# Patient Record
Sex: MALE | Race: WHITE | Employment: OTHER | URBAN - NONMETROPOLITAN AREA
[De-identification: names, ages, dates, MRNs, and addresses within clinical notes are randomized per-mention and may not be internally consistent; named-entity substitution may affect disease eponyms.]

---

## 2021-09-25 ENCOUNTER — APPOINTMENT (OUTPATIENT)
Dept: GENERAL RADIOLOGY | Age: 76
End: 2021-09-25
Payer: OTHER GOVERNMENT

## 2021-09-25 ENCOUNTER — HOSPITAL ENCOUNTER (EMERGENCY)
Age: 76
Discharge: HOME | End: 2021-09-25
Attending: EMERGENCY MEDICINE
Payer: OTHER GOVERNMENT

## 2021-09-25 VITALS
WEIGHT: 228 LBS | BODY MASS INDEX: 30.88 KG/M2 | SYSTOLIC BLOOD PRESSURE: 178 MMHG | TEMPERATURE: 98.1 F | DIASTOLIC BLOOD PRESSURE: 76 MMHG | HEIGHT: 72 IN | OXYGEN SATURATION: 95 % | HEART RATE: 76 BPM | RESPIRATION RATE: 18 BRPM

## 2021-09-25 DIAGNOSIS — J06.9 VIRAL URI WITH COUGH: ICD-10-CM

## 2021-09-25 DIAGNOSIS — Z20.822 SUSPECTED COVID-19 VIRUS INFECTION: Primary | ICD-10-CM

## 2021-09-25 DIAGNOSIS — J40 BRONCHITIS: ICD-10-CM

## 2021-09-25 LAB
CHP ED QC CHECK: YES
GLUCOSE BLD-MCNC: 152 MG/DL
GLUCOSE BLD-MCNC: 152 MG/DL (ref 70–99)

## 2021-09-25 PROCEDURE — 94640 AIRWAY INHALATION TREATMENT: CPT

## 2021-09-25 PROCEDURE — 99283 EMERGENCY DEPT VISIT LOW MDM: CPT

## 2021-09-25 PROCEDURE — 93005 ELECTROCARDIOGRAM TRACING: CPT | Performed by: EMERGENCY MEDICINE

## 2021-09-25 PROCEDURE — U0005 INFEC AGEN DETEC AMPLI PROBE: HCPCS

## 2021-09-25 PROCEDURE — 71045 X-RAY EXAM CHEST 1 VIEW: CPT

## 2021-09-25 PROCEDURE — 6370000000 HC RX 637 (ALT 250 FOR IP): Performed by: EMERGENCY MEDICINE

## 2021-09-25 PROCEDURE — 82962 GLUCOSE BLOOD TEST: CPT

## 2021-09-25 PROCEDURE — U0003 INFECTIOUS AGENT DETECTION BY NUCLEIC ACID (DNA OR RNA); SEVERE ACUTE RESPIRATORY SYNDROME CORONAVIRUS 2 (SARS-COV-2) (CORONAVIRUS DISEASE [COVID-19]), AMPLIFIED PROBE TECHNIQUE, MAKING USE OF HIGH THROUGHPUT TECHNOLOGIES AS DESCRIBED BY CMS-2020-01-R: HCPCS

## 2021-09-25 RX ORDER — ALBUTEROL SULFATE 90 UG/1
2 AEROSOL, METERED RESPIRATORY (INHALATION) 4 TIMES DAILY PRN
Qty: 18 G | Refills: 0 | Status: SHIPPED | OUTPATIENT
Start: 2021-09-25

## 2021-09-25 RX ORDER — GUAIFENESIN 600 MG/1
1200 TABLET, EXTENDED RELEASE ORAL 2 TIMES DAILY PRN
Qty: 40 TABLET | Refills: 0 | Status: SHIPPED | OUTPATIENT
Start: 2021-09-25 | End: 2021-10-05

## 2021-09-25 RX ORDER — ALBUTEROL SULFATE 90 UG/1
2 AEROSOL, METERED RESPIRATORY (INHALATION) ONCE
Status: COMPLETED | OUTPATIENT
Start: 2021-09-25 | End: 2021-09-25

## 2021-09-25 RX ORDER — ALBUTEROL SULFATE 90 UG/1
2 AEROSOL, METERED RESPIRATORY (INHALATION) 4 TIMES DAILY PRN
Qty: 18 G | Refills: 0 | Status: SHIPPED | OUTPATIENT
Start: 2021-09-25 | End: 2021-09-25 | Stop reason: SDUPTHER

## 2021-09-25 RX ADMIN — ALBUTEROL SULFATE 2 PUFF: 108 INHALANT RESPIRATORY (INHALATION) at 11:44

## 2021-09-25 NOTE — ED PROVIDER NOTES
CHIEF COMPLAINT  Chief Complaint   Patient presents with    Cough     x 1 month    Chest Congestion       HPI  Elena Bermudez is a 70 y.o. male with history of cancer, hypertension, diabetes on Metformin with insulin usage who presents cough over the past month with shortness of breath and chest congestion that worsened since last night. Last Monday he was tested for Covid and was negative on that date. His wife started to become ill last Wednesday and then she tested positive for Covid. Starting last night he has had chest congestion, cough, decreased appetite and fatigue. He is uncertain if he should take his insulin as he is not eaten anything yet today. He denies any leg swelling or history of DVT or PE. He states he like to be tested for Covid to know whether or not he needs to quarantine. Signs and symptoms are currently mild to moderate, persistent. He denies any history of underlying lung condition. REVIEW OF SYSTEMS  Review of Systems   History obtained from chart review and the patient  General ROS: positive for  - fatigue  Ophthalmic ROS: negative for - decreased vision or double vision  ENT ROS: negative for - headaches  Hematological and Lymphatic ROS: negative for - bleeding problems or bruising  Endocrine ROS: negative for - unexpected weight changes  Respiratory ROS: positive for - cough and shortness of breath  Cardiovascular ROS: no chest pain or dyspnea on exertion  Gastrointestinal ROS: no abdominal pain, change in bowel habits, or black or bloody stools  Genito-Urinary ROS: no dysuria, trouble voiding, or hematuria  Musculoskeletal ROS: negative for - joint stiffness or joint swelling  Neurological ROS: no TIA or stroke symptoms      PAST MEDICAL HISTORY  Past Medical History:   Diagnosis Date    Cancer ([facility] [address]     Hypertension     Osteoarthritis        FAMILY HISTORY  History reviewed. No pertinent family history.     SOCIAL HISTORY  Social History     Socioeconomic History    Marital status:      Spouse name: None    Number of children: None    Years of education: None    Highest education level: None   Occupational History    None   Tobacco Use    Smoking status: Never Smoker    Smokeless tobacco: Never Used   Substance and Sexual Activity    Alcohol use: Not Currently    Drug use: Not Currently    Sexual activity: None   Other Topics Concern    None   Social History Narrative    None     Social Determinants of Health     Financial Resource Strain:     Difficulty of Paying Living Expenses:    Food Insecurity:     Worried About Running Out of Food in the Last Year:     Ran Out of Food in the Last Year:    Transportation Needs:     Lack of Transportation (Medical):  Lack of Transportation (Non-Medical):    Physical Activity:     Days of Exercise per Week:     Minutes of Exercise per Session:    Stress:     Feeling of Stress :    Social Connections:     Frequency of Communication with Friends and Family:     Frequency of Social Gatherings with Friends and Family:     Attends Christianity Services:     Active Member of Clubs or Organizations:     Attends Club or Organization Meetings:     Marital Status:    Intimate Partner Violence:     Fear of Current or Ex-Partner:     Emotionally Abused:     Physically Abused:     Sexually Abused:        SURGICAL HISTORY  History reviewed. No pertinent surgical history. CURRENT MEDICATIONS  No current facility-administered medications on file prior to encounter.      Current Outpatient Medications on File Prior to Encounter   Medication Sig Dispense Refill    aspirin 81 MG tablet Take 81 mg by mouth daily      vitamin B-12 (CYANOCOBALAMIN) 500 MCG tablet Take 500 mcg by mouth daily      insulin glargine (LANTUS) 100 UNIT/ML injection vial Inject into the skin nightly      insulin lispro (HUMALOG) 100 UNIT/ML injection vial Inject into the skin 3 times daily (before meals)      losartan (COZAAR) 25 MG tablet Take 25 mg by mouth daily      metFORMIN (GLUCOPHAGE) 850 MG tablet Take 850 mg by mouth 2 times daily (with meals)      Multiple Vitamins-Minerals (THERAPEUTIC MULTIVITAMIN-MINERALS) tablet Take 1 tablet by mouth daily      simvastatin (ZOCOR) 40 MG tablet Take 40 mg by mouth nightly      tadalafil (CIALIS) 20 MG tablet Take 20 mg by mouth as needed for Erectile Dysfunction      terbinafine (LAMISIL) 1 % cream Apply topically 2 times daily Apply topically 2 times daily. ALLERGIES  Allergies   Allergen Reactions    Lisinopril     Toradol [Ketorolac Tromethamine]        PHYSICAL EXAM  VITAL SIGNS: BP (!) 178/76   Pulse 76   Temp 98.1 °F (36.7 °C) (Oral)   Resp 18   Ht 6' (1.829 m)   Wt 228 lb (103.4 kg)   SpO2 95%   BMI 30.92 kg/m²   Constitutional: Well developed, Well nourished, resting in bed, active, pleasant  HENT: Normocephalic, Atraumatic, Bilateral external ears normal, mask in place per recommendations  Eyes: PERRL, EOMI, Conjunctiva normal, No discharge. Neck: Normal range of motion, Supple, No stridor. Cardiovascular: Normal heart rate, Normal rhythm, No murmurs, No rubs, No gallops. Thorax & Lungs: Diminished bibasilar breath sounds, No respiratory distress, mild expiratory wheezing, No chest tenderness. Abdomen: Bowel sounds normal, Soft, No tenderness, no guarding, no rebound, No masses, No pulsatile masses. Skin: Warm, Dry, No erythema, No rash. Extremities: Intact distal pulses, No edema, No tenderness, No cyanosis, No clubbing. Musculoskeletal: Good gross range of motion in all major joints. No major deformities noted. Neurologic: Alert & oriented x 3, Normal gross motor function, Normal gross sensory function, No focal deficits noted.    Psychiatric: Affect normal    EKG  EKG Interpretation    Interpreted by emergency department physician from September 25 at 1154    Rhythm: normal sinus   Rate: normal  Axis: normal  Ectopy: none  Conduction: normal  ST Segments: nonspecific changes  T Waves: non specific changes  Q Waves: none    Clinical Impression: Normal sinus rhythm with a rate of 75 and nonspecific ST abnormalities, greatest in the anterior leads, no STEMI or ectopy    Arcadio Torrez MD      RADIOLOGY/PROCEDURES/LABS  Last Imaging results   XR CHEST PORTABLE   Preliminary Result   1. Mild cardiomegaly. 2. Asymmetric elevation of the right diaphragm. 3. No acute focal process identified. Imaging reviewed by myself    Labs Reviewed   POCT GLUCOSE - Abnormal; Notable for the following components:       Result Value    POC Glucose 152 (*)     All other components within normal limits   POCT GLUCOSE - Normal   COVID-19         Medications   albuterol sulfate  (90 Base) MCG/ACT inhaler 2 puff (2 puffs Inhalation Given 9/25/21 1144)       COURSE & MEDICAL DECISION MAKING  Pertinent Labs & Imaging studies reviewed. (See chart for details)    79-year-old male presents with viral URI type signs and symptoms with suggestion of bronchitis. He does have some diminished aeration, harsh coughing, rhonchi and some mild wheezes. He was started on albuterol and had improvement and started having some mild productive nature to his cough as the bronchospasm improved. He also was concerned regarding his insulin as he has decreased appetite. As he is having decreased p.o. intake, his insulin will be down adjusted for the next several days until his appetite returns, halving his insulin with meals and keeping a close eye on his blood sugar. His chest x-ray does not demonstrate focal pneumonia, he is saturating well on room air, is otherwise active without increased work of breathing. He will be discharged with directed to quarantine, Covid testing pending based on his significant exposure and signs and symptoms.         FINAL IMPRESSION  Problem List Items Addressed This Visit     None      Visit Diagnoses     Suspected COVID-19 virus infection    -  Primary Viral URI with cough        Bronchitis          1.    2.   3.    Patient gave me permission to discuss medical history, care, and plan with those present in the room.   Electronically signed by: Darci Marquez MD, 9/25/2021  MD Darci Montalvo MD  09/25/21 1626

## 2021-09-25 NOTE — ED NOTES
Discharge instructions reviewed; patient verbalized understanding; patient transferred from ED via private vehicle by family.       Jennifer Oconnell RN  09/25/21 0710

## 2021-09-26 LAB
EKG ATRIAL RATE: 75 BPM
EKG DIAGNOSIS: NORMAL
EKG P AXIS: 24 DEGREES
EKG P-R INTERVAL: 178 MS
EKG Q-T INTERVAL: 362 MS
EKG QRS DURATION: 94 MS
EKG QTC CALCULATION (BAZETT): 404 MS
EKG R AXIS: 29 DEGREES
EKG T AXIS: -15 DEGREES
EKG VENTRICULAR RATE: 75 BPM

## 2021-09-26 PROCEDURE — 93010 ELECTROCARDIOGRAM REPORT: CPT | Performed by: INTERNAL MEDICINE

## 2021-09-27 ENCOUNTER — CARE COORDINATION (OUTPATIENT)
Dept: CARE COORDINATION | Age: 76
End: 2021-09-27

## 2021-09-27 LAB
SARS-COV-2: NOT DETECTED
SOURCE: NORMAL

## 2021-09-28 ENCOUNTER — CARE COORDINATION (OUTPATIENT)
Dept: CARE COORDINATION | Age: 76
End: 2021-09-28

## 2021-09-28 NOTE — CARE COORDINATION
Patient contacted regarding COVID-19 risk. Discussed COVID-19 related testing which was available at this time. Test results were negative. Patient informed of results, if available? Yes. Ambulatory Care Manager contacted the patient by telephone to perform post discharge assessment. Call within 2 business days of discharge: Yes. Verified name and  with patient as identifiers. Provided introduction to self, and explanation of the CTN/ACM role, and reason for call due to risk factors for infection and/or exposure to COVID-19. Symptoms reviewed with patient who verbalized the following symptoms: no new symptoms and no worsening symptoms. Due to no new or worsening symptoms encounter was not routed to provider for escalation. Discussed follow-up appointments. If no appointment was previously scheduled, appointment scheduling offered: No.  [facility] follow up appointment(s): No future appointments. Non-[facility] follow up appointment(s):     Non-face-to-face services provided:  Obtained and reviewed discharge summary and/or continuity of care documents     Advance Care Planning:   Does patient have an Advance Directive:  decision maker updated. Educated patient about risk for severe COVID-19 due to risk factors according to CDC guidelines. ACM reviewed discharge instructions, medical action plan and red flag symptoms with the patient who verbalized understanding. Discussed COVID vaccination status: Yes. Education provided on COVID-19 vaccination as appropriate. Discussed exposure protocols and quarantine with CDC Guidelines. Patient was given an opportunity to verbalize any questions and concerns and agrees to contact ACM or health care provider for questions related to their healthcare. Reviewed and educated patient on any new and changed medications related to discharge diagnosis     Was patient discharged with a pulse oximeter?  No Discussed and confirmed pulse oximeter discharge instructions and when

## 2022-01-05 ENCOUNTER — HOSPITAL ENCOUNTER (EMERGENCY)
Age: 77
Discharge: HOME | End: 2022-01-05
Attending: EMERGENCY MEDICINE
Payer: OTHER GOVERNMENT

## 2022-01-05 ENCOUNTER — APPOINTMENT (OUTPATIENT)
Dept: GENERAL RADIOLOGY | Age: 77
End: 2022-01-05
Payer: OTHER GOVERNMENT

## 2022-01-05 VITALS
WEIGHT: 230 LBS | DIASTOLIC BLOOD PRESSURE: 76 MMHG | SYSTOLIC BLOOD PRESSURE: 163 MMHG | TEMPERATURE: 97.6 F | OXYGEN SATURATION: 96 % | HEIGHT: 72 IN | HEART RATE: 70 BPM | RESPIRATION RATE: 18 BRPM | BODY MASS INDEX: 31.15 KG/M2

## 2022-01-05 DIAGNOSIS — R11.2 NAUSEA AND VOMITING, INTRACTABILITY OF VOMITING NOT SPECIFIED, UNSPECIFIED VOMITING TYPE: ICD-10-CM

## 2022-01-05 DIAGNOSIS — J06.9 UPPER RESPIRATORY TRACT INFECTION, UNSPECIFIED TYPE: Primary | ICD-10-CM

## 2022-01-05 PROCEDURE — U0003 INFECTIOUS AGENT DETECTION BY NUCLEIC ACID (DNA OR RNA); SEVERE ACUTE RESPIRATORY SYNDROME CORONAVIRUS 2 (SARS-COV-2) (CORONAVIRUS DISEASE [COVID-19]), AMPLIFIED PROBE TECHNIQUE, MAKING USE OF HIGH THROUGHPUT TECHNOLOGIES AS DESCRIBED BY CMS-2020-01-R: HCPCS

## 2022-01-05 PROCEDURE — U0005 INFEC AGEN DETEC AMPLI PROBE: HCPCS

## 2022-01-05 PROCEDURE — 99283 EMERGENCY DEPT VISIT LOW MDM: CPT

## 2022-01-05 PROCEDURE — 71046 X-RAY EXAM CHEST 2 VIEWS: CPT

## 2022-01-05 RX ORDER — ONDANSETRON 4 MG/1
4 TABLET, ORALLY DISINTEGRATING ORAL EVERY 6 HOURS PRN
Qty: 10 TABLET | Refills: 0 | Status: SHIPPED | OUTPATIENT
Start: 2022-01-05

## 2022-01-05 RX ORDER — ALBUTEROL SULFATE 90 UG/1
2 AEROSOL, METERED RESPIRATORY (INHALATION) EVERY 4 HOURS PRN
Qty: 18 G | Refills: 0 | Status: SHIPPED | OUTPATIENT
Start: 2022-01-05

## 2022-01-05 RX ORDER — DEXTROMETHORPHAN HYDROBROMIDE AND PROMETHAZINE HYDROCHLORIDE 15; 6.25 MG/5ML; MG/5ML
5 SYRUP ORAL 4 TIMES DAILY PRN
Qty: 120 ML | Refills: 0 | Status: SHIPPED | OUTPATIENT
Start: 2022-01-05 | End: 2022-01-12

## 2022-01-05 RX ORDER — BENZONATATE 100 MG/1
200 CAPSULE ORAL 3 TIMES DAILY PRN
Qty: 21 CAPSULE | Refills: 0 | Status: SHIPPED | OUTPATIENT
Start: 2022-01-05 | End: 2022-01-12

## 2022-01-05 RX ORDER — LOPERAMIDE HYDROCHLORIDE 2 MG/1
2 CAPSULE ORAL 4 TIMES DAILY PRN
Qty: 30 CAPSULE | Refills: 0 | Status: SHIPPED | OUTPATIENT
Start: 2022-01-05 | End: 2022-01-12

## 2022-01-05 NOTE — ED PROVIDER NOTES
Emergency Department Encounter  Location: [facility] [address] [place] [address]    Patient: Sergio Walsh  MRN: [id number]  : 1945  Date of evaluation: 2022  ED Provider: Cody Evans DO, FACEP    Chief Complaint:    Concern For COVID-19    [place]:  Sergio Walsh is a 78 y.o. male that presents to the emergency department with complaints of a cough and one episode of vomiting today. He was sent by the [facility] with concerns for Covid. The patient denies loss of taste or smell. He has had some loose stools but no buddy diarrhea. He denies fever. He denies shortness of breath. Positive for sore throat      ROS - see HPI, below listed is current ROS at time of my eval:  At least 10 systems reviewed and otherwise acutely negative except as in the [address] General:  No fevers, no chills, no weakness  Eyes:  No recent vison changes, no discharge  ENT:  No sore throat, no nasal congestion, no hearing changes  Cardiovascular:  No chest pain, no palpitations  Respiratory:  No shortness of breath, no cough, no wheezing  Gastrointestinal:  No pain, positive for nausea with 1 episode of vomiting. Musculoskeletal:  No muscle pain, no joint pain, no body aches  Skin:  No rash, no pruritis, no easy bruising  Neurologic:  No speech problems, no headache, no extremity numbness, no extremity tingling, no extremity weakness  Psychiatric:  No anxiety  Genitourinary:  No dysuria, no hematuria  Endocrine:  No unexpected weight gain, no unexpected weight loss  Extremities:  no edema, no pain    Past Medical History:   Diagnosis Date    Cancer ([address]     Hypertension     Osteoarthritis      History reviewed. No pertinent surgical history. History reviewed. No pertinent family history.   Social History     Socioeconomic History    Marital status:      Spouse name: Not on file    Number of children: Not on file    Years of education: Not on file    Highest education level: Not on file   Occupational History  Not on file   Tobacco Use    Smoking status: Never Smoker    Smokeless tobacco: Never Used   Substance and Sexual Activity    Alcohol use: Not Currently    Drug use: Not Currently    Sexual activity: Not on file   Other Topics Concern    Not on file   Social History Narrative    Not on file     Social Determinants of Health     Financial Resource Strain:     Difficulty of Paying Living Expenses: Not on file   Food Insecurity:     Worried About Running Out of Food in the Last Year: Not on file    [place] of Food in the Last Year: Not on file   Transportation Needs:     Lack of Transportation (Medical): Not on file    Lack of Transportation (Non-Medical): Not on file   Physical Activity:     Days of Exercise per Week: Not on file    Minutes of Exercise per Session: Not on file   Stress:     Feeling of Stress : Not on file   Social Connections:     Frequency of Communication with Friends and Family: Not on file    Frequency of Social Gatherings with Friends and Family: Not on file    Attends Jainism Services: Not on file    Active Member of [address] [place] [address] or Organizations: Not on file    Attends Club or Organization Meetings: Not on file    Marital Status: Not on file   Intimate Partner Violence:     Fear of Current or Ex-Partner: Not on file    Emotionally Abused: Not on file    Physically Abused: Not on file    Sexually Abused: Not on file   Housing Stability:     Unable to Pay for Housing in the Last Year: Not on file    Number of [place] in the Last Year: Not on file    Unstable Housing in the Last Year: Not on file     No current facility-administered medications for this encounter.      Current Outpatient Medications   Medication Sig Dispense Refill    albuterol sulfate  (90 Base) MCG/ACT inhaler Inhale 2 puffs into the lungs every 4 hours as needed for Wheezing Pharmacist may substitute 18 g 0    ondansetron (ZOFRAN ODT) 4 MG disintegrating tablet Take 1 tablet by mouth every 6 hours as needed for Nausea or Vomiting 10 tablet 0    loperamide (RA ANTI-DIARRHEAL) 2 MG capsule Take 1 capsule by mouth 4 times daily as needed for Diarrhea 30 capsule 0    benzonatate (TESSALON PERLES) 100 MG capsule Take 2 capsules by mouth 3 times daily as needed for Cough 21 capsule 0    promethazine-dextromethorphan (PROMETHAZINE-DM) 6.25-15 MG/5ML syrup Take 5 mLs by mouth 4 times daily as needed for Cough 120 mL 0    Spacer/Aero-Holding Chambers GHAZAL 1 Device by Does not apply route daily as needed (with inhaler) 1 each 0    albuterol sulfate HFA (VENTOLIN HFA) 108 (90 Base) MCG/ACT inhaler Inhale 2 puffs into the lungs 4 times daily as needed for Wheezing 18 g 0    aspirin 81 MG tablet Take 81 mg by mouth daily      vitamin B-12 (CYANOCOBALAMIN) 500 MCG tablet Take 500 mcg by mouth daily      insulin glargine (LANTUS) 100 UNIT/ML injection vial Inject into the skin nightly      insulin lispro (HUMALOG) 100 UNIT/ML injection vial Inject into the skin 3 times daily (before meals)      losartan (COZAAR) 25 MG tablet Take 25 mg by mouth daily      metFORMIN (GLUCOPHAGE) 850 MG tablet Take 850 mg by mouth 2 times daily (with meals)      Multiple Vitamins-Minerals (THERAPEUTIC MULTIVITAMIN-MINERALS) tablet Take 1 tablet by mouth daily      simvastatin (ZOCOR) 40 MG tablet Take 40 mg by mouth nightly      tadalafil (CIALIS) 20 MG tablet Take 20 mg by mouth as needed for Erectile Dysfunction      terbinafine (LAMISIL) 1 % cream Apply topically 2 times daily Apply topically 2 times daily.        Allergies   Allergen Reactions    Lisinopril     Toradol [Ketorolac Tromethamine]        Nursing Notes Reviewed    Physical Exam:  ED Triage Vitals [01/05/22 1018]   Enc Vitals Group      BP (!) 163/76      Pulse 70      Resp 18      Temp 97.6 °F (36.4 °C)      Temp src       SpO2 96 %      Weight 230 lb (104.3 kg)      Height 6' (1.829 m)      Head Circumference       Peak Flow       Pain Score Pain Loc       Pain Edu? Excl. in [address]? GENERAL APPEARANCE: Awake and alert. Cooperative. No acute distress. Nontoxic in appearance  HEAD: Normocephalic. Atraumatic. EYES: EOM's grossly intact. Sclera anicteric. ENT: Tolerates saliva. No trismus. NECK: Supple. Trachea midline. CARDIO: RRR. Radial pulse 2+. LUNGS: Respirations unlabored. CTAB. No wheezes rales or rhonchi  ABDOMEN: Soft. Non-distended. Non-tender. No guarding or rebound  EXTREMITIES: No acute deformities. No tenderness or swelling's  SKIN: Warm and dry. NEUROLOGICAL: No gross facial drooping. Moves all 4 extremities spontaneously. PSYCHIATRIC: Normal mood. Labs:  No results found for this visit on 01/05/22. Radiographs (if obtained):  [] The following radiograph was interpreted by myself in the absence of a radiologist:  [x] Radiologist's Report reviewed at time of ED visit:  XR CHEST (2 VW)   Final Result   1. Stable chest x-ray with chronic elevation of the right hemidiaphragm. ED Course and MDM:  Patient's x-ray is stable with no evidence of pneumonia. Here in the ER the patient will be given a prescription for albuterol inhaler, Phenergan DM, Zofran, Imodium, and Tessalon Perles. A Covid test will be ordered on an outpatient basis and he will be discharged in stable condition to follow-up with the [facility]. He is to return if his condition worsens. He is discharged stable condition at this time. Final Impression:  1. Upper respiratory tract infection, unspecified type    2.  Nausea and vomiting, intractability of vomiting not specified, unspecified vomiting type      DISPOSITION Discharge - Pending Orders Complete    Patient referred to:  [facility]  [address] [facility].  [phone]  Schedule an appointment as soon as possible for a visit in 1 week  For follow up    [facility] Emergency Department  [place] [address]  [phone]  Go to   If symptoms worsen    Discharge medications:  New Prescriptions    ALBUTEROL SULFATE  (90 BASE) MCG/ACT INHALER    Inhale 2 puffs into the lungs every 4 hours as needed for Wheezing Pharmacist may substitute    BENZONATATE (TESSALON PERLES) 100 MG CAPSULE    Take 2 capsules by mouth 3 times daily as needed for Cough    LOPERAMIDE (RA ANTI-DIARRHEAL) 2 MG CAPSULE    Take 1 capsule by mouth 4 times daily as needed for Diarrhea    ONDANSETRON (ZOFRAN ODT) 4 MG DISINTEGRATING TABLET    Take 1 tablet by mouth every 6 hours as needed for Nausea or Vomiting    PROMETHAZINE-DEXTROMETHORPHAN (PROMETHAZINE-DM) 6.25-15 MG/5ML SYRUP    Take 5 mLs by mouth 4 times daily as needed for Cough     (Please note that portions of this note may have been completed with a voice recognition program. Efforts were made to edit the dictations but occasionally words are mis-transcribed.)    Sarthak Johnson DO, [facility]  Board certified in [place], [place]  01/05/22 3247

## 2022-01-06 LAB
SARS-COV-2: NOT DETECTED
SOURCE: NORMAL

## 2022-06-21 ENCOUNTER — HOSPITAL ENCOUNTER (OUTPATIENT)
Dept: PHYSICAL THERAPY | Age: 77
Setting detail: THERAPIES SERIES
Discharge: HOME | End: 2022-06-21
Payer: OTHER GOVERNMENT

## 2022-06-21 PROCEDURE — 97162 PT EVAL MOD COMPLEX 30 MIN: CPT

## 2022-06-21 PROCEDURE — 97110 THERAPEUTIC EXERCISES: CPT

## 2022-06-21 NOTE — FLOWSHEET NOTE
Outpatient Physical Therapy  [place]           [] Phone: [phone]   Fax: [phone]  [facility]           [x] Phone: [phone]   Fax: [phone]        Physical Therapy Daily Treatment Note  Date:  2022    Patient Name:  Jade Shore    :  1945  MRN: [id number]  Restrictions/Precautions: fall risk    Diagnosis:   Spinal stenosis, site unspecified [M48.00]  Radiculopathy, lumbar region [M54.16]    Date of Injury/Surgery: chronic  Treatment Diagnosis:    M62.81 muscle weakness, R26.89 abnormality of gait  Insurance/Certification information:  [place]  Referring Physician:  Nanci Wallace MD     PCP: No primary care provider on file. Plan of care signed (Y/N):  bert faxed  Outcome Measure: 2 min walk test: 576.5 feet with pain around 1' and limping by 1:20 secondary to pain. Oswestry: 15/50 = 30% disability   Visit# / total visits:   1/  Pain level: 0/10   Goals:     Patient goals:   pt reports he does not think PT will be able to help much    Long term goals to be achieved by 2022:   Long term goal 1: Pt will demonstrate I with current HEP as prescribed in order to increase strength. Long term goal 2: Pt will demonstrate R hip strength at least 4/5 in order to improve strength. Long term goal 3: Pt will demonstrate an Oswetry score of no more than 20% disability in order to improve quality of life. Long term goal 4: Pt will report worst pain no more than 4/10 in the last week in order to improve quality of life. Long term goal 5: Pt will demonstrate the ability to complete the 2' walk test with no increase in symptoms in order to decrease pain      Subjective:  See bert     Any changes in Ambulatory Summary Sheet?   None    Objective:  See bert   COVID screening questions were asked and patient attested that there had been no contact or symptoms    Exercises: (No more than 4 columns)   Exercise/Equipment Date: 22 Date Date           WARM UP      NuStep TABLE      SKTC 3x30\" R LE     Figure 4 piriformis stretch 2x30\" R LE     glute set 1x10 5\"     TA contraction 1x10 5\"      TA contraction UE      TA contraction LE      bridges      Clamshell #1/2 R LE      Resisted hip add R LE      DKTC      SKTC                     STANDING                                                     PROPRIOCEPTION                                    MODALITIES                    Other Therapeutic Activities/Education:  Pt educated on PT findings, plan, prognosis, and POC. Pt educated on HEP and a handout was provided. Home Exercise Program:    Access Code: [email or web address]  URL: [employer]/  Date: 06/21/2022  Prepared by: Kaleb Guadarrama    Exercises  Supine Single Knee to Chest - 2 x daily - 7 x weekly - 3 sets - 5 sec hold  Supine Figure 4 Piriformis Stretch - 1 x daily - 7 x weekly - 2 sets - 30 sec hold  Supine Transversus Abdominis Bracing - Hands on Stomach - 2-3 x daily - 7 x weekly - 10 reps - 5 sec hold    Manual Treatments:  none    Modalities:  none    Communication with other providers:  eval faxed    Assessment:  (Response towards treatment session) (Pain Rating) 4/10 Pt reports increased pain following walking.       Plan for Next Session: Continue per POC     Time In / Time Out:  10:36-11:20 am      Timed Code/Total Treatment Minutes:  44'/1 mod eval, 2 therapeutic exercise    Next Progress Note due:  8/2/22    Plan of Care Interventions:  [x] Therapeutic Exercise  [] Modalities:  [x] Therapeutic Activity     [] Ultrasound  [] Estim  [x] Gait Training      [] Cervical Traction [] Lumbar Traction  [x] Neuromuscular Re-education    [] Cold/hotpack [] Iontophoresis   [x] Instruction in HEP      [] Vasopneumatic   [] Dry Needling    [] Manual Therapy               [] Aquatic Therapy              Electronically signed by:  Kaleb Guadarrama, PT,DPT [id number]  6/21/2022, 11:04 AM

## 2022-06-27 ENCOUNTER — HOSPITAL ENCOUNTER (OUTPATIENT)
Dept: PHYSICAL THERAPY | Age: 77
Setting detail: THERAPIES SERIES
Discharge: HOME | End: 2022-06-27
Payer: OTHER GOVERNMENT

## 2022-06-27 PROCEDURE — 97110 THERAPEUTIC EXERCISES: CPT

## 2022-06-29 ENCOUNTER — HOSPITAL ENCOUNTER (OUTPATIENT)
Dept: PHYSICAL THERAPY | Age: 77
Setting detail: THERAPIES SERIES
Discharge: HOME | End: 2022-06-29
Payer: OTHER GOVERNMENT

## 2022-06-29 PROCEDURE — 97110 THERAPEUTIC EXERCISES: CPT

## 2022-06-29 NOTE — FLOWSHEET NOTE
Outpatient Physical Therapy  [place]           [] Phone: [phone]   Fax: [phone]  [facility]           [x] Phone: [phone]   Fax: [phone]        Physical Therapy Daily Treatment Note  Date:  2022    Patient Name:  Madisyn Dacosta    :  1945  MRN: [id number]  Restrictions/Precautions: fall risk    Diagnosis:   Spinal stenosis, site unspecified [M48.00]  Radiculopathy, lumbar region [M54.16]    Date of Injury/Surgery: chronic  Treatment Diagnosis:    M62.81 muscle weakness, R26.89 abnormality of gait  Insurance/Certification information:  [place]  Referring Physician:  Zeynep Queen MD     PCP: No primary care provider on file. Plan of care signed (Y/N):  eval faxed  Outcome Measure: 2 min walk test: 576.5 feet with pain around 1' and limping by 1:20 secondary to pain. Oswestry: 15/50 = 30% disability   Visit# / total visits:   3/  Pain level: 4/10   Goals:     Patient goals:   pt reports he does not think PT will be able to help much    Long term goals to be achieved by 2022:   Long term goal 1: Pt will demonstrate I with current HEP as prescribed in order to increase strength. Long term goal 2: Pt will demonstrate R hip strength at least 4/5 in order to improve strength. Long term goal 3: Pt will demonstrate an Oswetry score of no more than 20% disability in order to improve quality of life. Long term goal 4: Pt will report worst pain no more than 4/10 in the last week in order to improve quality of life. Long term goal 5: Pt will demonstrate the ability to complete the 2' walk test with no increase in symptoms in order to decrease pain      Subjective:  Patient states that his pain is worse today. Rates his pain 4-5/10 in the LB. Describes his pain as nagging. Any changes in Ambulatory Summary Sheet?   None    Objective:  Patient log rolled to side to transfer from supine to sit which was less painful  COVID screening questions were asked and patient attested that there had been no contact or symptoms    Exercises: (No more than 4 columns)   Exercise/Equipment Date: 6/21/22 Date  6/27/2022 Date  6/29/2022           WARM UP      NuStep     5 min Lev 2 Seat 12, Arms 10 x 10 min Lev 3         TABLE      SKTC 3x30\" R LE 3x30\" R LE 3x30\" R LE   Figure 4 piriformis stretch 2x30\" R LE 2x30\" R LE 3x30\" R LE   glute set 1x10 5\" 10x5\" 10x5\"   TA contraction 1x10 5\"  10x5\" 10x5\"   TA contraction UE  10x 15x    TA contraction LE  10x B 15x ea side    bridges  10x 10x   Clamshell #1/2 R LE  10x ea  2x10 ea    Resisted hip add R LE      DKTC  With peanut ball  10x5\" With peanut ball   15x5\"   SKTC                     STANDING                                                     PROPRIOCEPTION                                    MODALITIES                    Other Therapeutic Activities/Education:  Pt educated on PT findings, plan, prognosis, and POC. Pt educated on HEP and a handout was provided. Home Exercise Program:    Access Code: [email or web address]  URL: [email or web address]  Date: 06/21/2022  Prepared by: Sara Soliman    Exercises  Supine Single Knee to Chest - 2 x daily - 7 x weekly - 3 sets - 5 sec hold  Supine Figure 4 Piriformis Stretch - 1 x daily - 7 x weekly - 2 sets - 30 sec hold  Supine Transversus Abdominis Bracing - Hands on Stomach - 2-3 x daily - 7 x weekly - 10 reps - 5 sec hold    Manual Treatments:  none    Modalities:  none    Communication with other providers:  eval faxed    Assessment:  (Response towards treatment session) (Pain Rating)  Patient rated his LB pain 3/10 after treatment.       Plan for Next Session: Continue per POC     Time In / Time Out:  8042/8939  Timed Code/Total Treatment Minutes:  27' TE    Next Progress Note due:  8/2/22    Plan of Care Interventions:  [x] Therapeutic Exercise  [] Modalities:  [x] Therapeutic Activity     [] Ultrasound  [] Estim  [x] Gait Training      [] Cervical Traction [] Lumbar Traction  [x] Neuromuscular Re-education    [] Cold/hotpack [] Iontophoresis   [x] Instruction in HEP      [] Vasopneumatic   [] Dry Needling    [] Manual Therapy               [] Aquatic Therapy              Electronically signed by:  Niya Christian PTA   6/29/2022, 1:05 PM

## 2022-06-30 NOTE — FLOWSHEET NOTE
Patients Plan of Care was received and signed. Signed POC was scanned and placed in the patients chart.     Charlette Hassan

## 2022-07-06 ENCOUNTER — HOSPITAL ENCOUNTER (OUTPATIENT)
Dept: PHYSICAL THERAPY | Age: 77
Setting detail: THERAPIES SERIES
Discharge: HOME | End: 2022-07-06
Payer: OTHER GOVERNMENT

## 2022-07-06 PROCEDURE — 97110 THERAPEUTIC EXERCISES: CPT

## 2022-07-06 NOTE — FLOWSHEET NOTE
Outpatient Physical Therapy  [place]           [] Phone: [phone]   Fax: [phone]  [facility]           [x] Phone: [phone]   Fax: [phone]        Physical Therapy Daily Treatment Note  Date:  2022    Patient Name:  Roxann Smith    :  1945  MRN: [id number]  Restrictions/Precautions: fall risk    Diagnosis:   Spinal stenosis, site unspecified [M48.00]  Radiculopathy, lumbar region [M54.16]    Date of Injury/Surgery: chronic  Treatment Diagnosis:    M62.81 muscle weakness, R26.89 abnormality of gait  Insurance/Certification information:  [place]  Referring Physician:  Aryan Escalera MD     PCP: No primary care provider on file. Plan of care signed (Y/N):  eval faxed  Outcome Measure: 2 min walk test: 576.5 feet with pain around 1' and limping by 1:20 secondary to pain. Oswestry: 15/50 = 30% disability   Visit# / total visits:   4/  Pain level: 0/10   Goals:     Patient goals:   pt reports he does not think PT will be able to help much    Long term goals to be achieved by 2022:   Long term goal 1: Pt will demonstrate I with current HEP as prescribed in order to increase strength. Long term goal 2: Pt will demonstrate R hip strength at least 4/5 in order to improve strength. Long term goal 3: Pt will demonstrate an Oswetry score of no more than 20% disability in order to improve quality of life. Long term goal 4: Pt will report worst pain no more than 4/10 in the last week in order to improve quality of life. Long term goal 5: Pt will demonstrate the ability to complete the 2' walk test with no increase in symptoms in order to decrease pain      Subjective: Patient denies any pain today. Any changes in Ambulatory Summary Sheet?   None    Objective:  Patient log rolled to side to transfer from supine to sit which was less painful  COVID screening questions were asked and patient attested that there had been no contact or symptoms    Exercises: (No more than 4 columns)   Exercise/Equipment Date: 6/21/22 Date  6/27/2022 Date  6/29/2022 7/6/2022            WARM UP       NuStep     5 min Lev 2 Seat 12, Arms 10 x 10 min Lev 3 Seat 12, Arms 10 x 10 min Lev 3          TABLE       SKTC 3x30\" R LE 3x30\" R LE 3x30\" R LE 3x30\" R LE   Figure 4 piriformis stretch 2x30\" R LE 2x30\" R LE 3x30\" R LE 3x30\" R LE   glute set 1x10 5\" 10x5\" 10x5\" 10x5\"   TA contraction 1x10 5\"  10x5\" 10x5\" 10x5\"   TA contraction UE  10x 15x  2# 2x10   TA contraction LE  10x B 15x ea side  2x10 ea side   bridges  10x 10x 2x10   Clamshell #1/2 R LE  10x ea  2x10 ea  RTB with #1 2x10  #2 2x10   Resisted hip add R LE       DKTC  With peanut ball  10x5\" With peanut ball   15x5\" With peanut ball  15x5\"   SKTC                        STANDING                                                             PROPRIOCEPTION                                          MODALITIES                       Other Therapeutic Activities/Education:  Pt educated on PT findings, plan, prognosis, and POC. Pt educated on HEP and a handout was provided. Home Exercise Program:    Access Code: [email or web address]  URL: [email or web address] [email or web address]  Date: 06/21/2022  Prepared by: Dale Vizcaino    Exercises  Supine Single Knee to Chest - 2 x daily - 7 x weekly - 3 sets - 5 sec hold  Supine Figure 4 Piriformis Stretch - 1 x daily - 7 x weekly - 2 sets - 30 sec hold  Supine Transversus Abdominis Bracing - Hands on Stomach - 2-3 x daily - 7 x weekly - 10 reps - 5 sec hold    Manual Treatments:  none    Modalities:  none    Communication with other providers:  eval faxed    Assessment:  (Response towards treatment session) (Pain Rating)  Patient rated his LB pain 2/10 after exercises.       Plan for Next Session: Continue per POC     Time In / Time Out:  2722/9755    Timed Code/Total Treatment Minutes:  28' TE    Next Progress Note due:  8/2/22    Plan of Care Interventions:  [x] Therapeutic Exercise  [] Modalities:  [x] Therapeutic Activity     [] Ultrasound  [] Estim  [x] Gait Training      [] Cervical Traction [] Lumbar Traction  [x] Neuromuscular Re-education    [] Cold/hotpack [] Iontophoresis   [x] Instruction in HEP      [] Vasopneumatic   [] Dry Needling    [] Manual Therapy               [] Aquatic Therapy              Electronically signed by:  Primitivo Diggs PTA   7/6/2022, 12:57 PM Render Post-Care Instructions In Note?: no Show Aperture Variable?: Yes Duration Of Freeze Thaw-Cycle (Seconds): 10-15 Post-Care Instructions: I reviewed with the patient in detail post-care instructions. Patient is to wear sunprotection, and avoid picking at any of the treated lesions. Pt may apply Vaseline to crusted or scabbing areas. Medical Necessity Clause: This procedure was medically necessary because the lesions that were treated were: Number Of Freeze-Thaw Cycles: 2 freeze-thaw cycles Medical Necessity Information: It is in your best interest to select a reason for this procedure from the list below. All of these items fulfill various CMS LCD requirements except the new and changing color options. Detail Level: Simple Consent: The patient's consent was obtained including but not limited to risks of crusting, scabbing, blistering, scarring, darker or lighter pigmentary change, recurrence, incomplete removal and infection. Duration Of Freeze Thaw-Cycle (Seconds): 10

## 2022-07-08 ENCOUNTER — HOSPITAL ENCOUNTER (OUTPATIENT)
Dept: PHYSICAL THERAPY | Age: 77
Setting detail: THERAPIES SERIES
Discharge: HOME | End: 2022-07-08
Payer: OTHER GOVERNMENT

## 2022-07-08 PROCEDURE — 97110 THERAPEUTIC EXERCISES: CPT

## 2022-07-08 NOTE — FLOWSHEET NOTE
Outpatient Physical Therapy  [place]           [] Phone: [phone]   Fax: [phone]  [facility]           [x] Phone: [phone]   Fax: [phone]        Physical Therapy Daily Treatment Note  Date:  2022    Patient Name:  Rowdy Aemzcua    :  1945  MRN: [id number]  Restrictions/Precautions: fall risk    Diagnosis:   Spinal stenosis, site unspecified [M48.00]  Radiculopathy, lumbar region [M54.16]    Date of Injury/Surgery: chronic  Treatment Diagnosis:    M62.81 muscle weakness, R26.89 abnormality of gait  Insurance/Certification information:  [place]  Referring Physician:  Abdoulaye Gay MD     PCP: No primary care provider on file. Plan of care signed (Y/N):  eval faxed  Outcome Measure: 2 min walk test: 576.5 feet with pain around 1' and limping by 1:20 secondary to pain. Oswestry: 15/50 = 30% disability   Visit# / total visits:   5/  Pain level: 2/10 in R LE   Goals:     Patient goals:   pt reports he does not think PT will be able to help much    Long term goals to be achieved by 2022:   Long term goal 1: Pt will demonstrate I with current HEP as prescribed in order to increase strength. Long term goal 2: Pt will demonstrate R hip strength at least 4/5 in order to improve strength. Long term goal 3: Pt will demonstrate an Oswetry score of no more than 20% disability in order to improve quality of life. Long term goal 4: Pt will report worst pain no more than 4/10 in the last week in order to improve quality of life. Long term goal 5: Pt will demonstrate the ability to complete the 2' walk test with no increase in symptoms in order to decrease pain      Subjective: Patient reports he has been doing his HEP and he has just some pain in R LE today. Pt was in a hurry to get on the NuStep today. Any changes in Ambulatory Summary Sheet?   None    Objective:  Patient log rolled to side to transfer from supine to sit which was less painful  COVID screening questions were asked and patient attested that there had been no contact or symptoms    Exercises: (No more than 4 columns)   Exercise/Equipment Date  6/27/2022 Date  6/29/2022 7/6/2022 Date: 7/8/22            WARM UP       NuStep    5 min Lev 2 Seat 12, Arms 10 x 10 min Lev 3 Seat 12, Arms 10 x 10 min Lev 3 x6' S12/A12 Lvl 4 Pt stopped secondary to getting a cramp. TABLE       SKTC 3x30\" R LE 3x30\" R LE 3x30\" R LE 3x30\" R LE w/ strap   Figure 4 piriformis stretch 2x30\" R LE 3x30\" R LE 3x30\" R LE 3x30\" R LE   glute set 10x5\" 10x5\" 10x5\" 1x10 5\"   TA contraction 10x5\" 10x5\" 10x5\" 10 5\"   TA contraction UE 10x 15x  2# 2x10 2# 2x10   TA contraction LE 10x B 15x ea side  2x10 ea side 2x10 B LE   bridges 10x 10x 2x10 2x10 3\"   Clamshell #1/2 R LE 10x ea  2x10 ea  RTB with #1 2x10  #2 2x10 2x10 with RTB    Resisted hip add R LE       DKTC With peanut ball  10x5\" With peanut ball   15x5\" With peanut ball  15x5\" 1x15 5\"  With peanut ball                           STANDING                                                             PROPRIOCEPTION                                          MODALITIES                       Other Therapeutic Activities/Education:     Home Exercise Program:    Access Code: [email or web address]  URL: [email or web address]  Date: 06/21/2022  Prepared by: Rashmi Hood    Exercises  Supine Single Knee to Chest - 2 x daily - 7 x weekly - 3 sets - 5 sec hold  Supine Figure 4 Piriformis Stretch - 1 x daily - 7 x weekly - 2 sets - 30 sec hold  Supine Transversus Abdominis Bracing - Hands on Stomach - 2-3 x daily - 7 x weekly - 10 reps - 5 sec hold    Manual Treatments:  none    Modalities:  none    Communication with other providers:  eval faxed    Assessment:  (Response towards treatment session) (Pain Rating)  Patient rated his LB and R LE pain 3/10 after exercises.       Plan for Next Session: Continue per POC     Time In / Time Out:  1:01-1:40 pm    Timed Code/Total Treatment Minutes:  44' TE    Next Progress Note due:  8/2/22    Plan of Care Interventions:  [x] Therapeutic Exercise  [] Modalities:  [x] Therapeutic Activity     [] Ultrasound  [] Estim  [x] Gait Training      [] Cervical Traction [] Lumbar Traction  [x] Neuromuscular Re-education    [] Cold/hotpack [] Iontophoresis   [x] Instruction in HEP      [] Vasopneumatic   [] Dry Needling    [] Manual Therapy               [] Aquatic Therapy              Electronically signed by:  Foreign Rebolledo PT DPT [id number] ,7/8/2022, 1:03 PM

## 2022-07-11 ENCOUNTER — HOSPITAL ENCOUNTER (OUTPATIENT)
Dept: PHYSICAL THERAPY | Age: 77
Setting detail: THERAPIES SERIES
Discharge: HOME | End: 2022-07-11
Payer: OTHER GOVERNMENT

## 2022-07-11 PROCEDURE — 97110 THERAPEUTIC EXERCISES: CPT

## 2022-07-11 NOTE — FLOWSHEET NOTE
Outpatient Physical Therapy  [place]           [] Phone: [phone]   Fax: [phone]  [facility]           [x] Phone: [phone]   Fax: [phone]        Physical Therapy Daily Treatment Note  Date:  2022    Patient Name:  Abimael Castillo    :  1945  MRN: [id number]  Restrictions/Precautions: fall risk    Diagnosis:   Spinal stenosis, site unspecified [M48.00]  Radiculopathy, lumbar region [M54.16]    Date of Injury/Surgery: chronic  Treatment Diagnosis:    M62.81 muscle weakness, R26.89 abnormality of gait  Insurance/Certification information:  [place]  Referring Physician:  Magalys Heller MD     PCP: No primary care provider on file. Plan of care signed (Y/N):  eval faxed  Outcome Measure: 2 min walk test: 576.5 feet with pain around 1' and limping by 1:20 secondary to pain. Oswestry: 15/50 = 30% disability   Visit# / total visits:   6/  Pain level: 0/10 in R LE   Goals:     Patient goals:   pt reports he does not think PT will be able to help much    Long term goals to be achieved by 2022:   Long term goal 1: Pt will demonstrate I with current HEP as prescribed in order to increase strength. Long term goal 2: Pt will demonstrate R hip strength at least 4/5 in order to improve strength. Long term goal 3: Pt will demonstrate an Oswetry score of no more than 20% disability in order to improve quality of life. Long term goal 4: Pt will report worst pain no more than 4/10 in the last week in order to improve quality of life. Long term goal 5: Pt will demonstrate the ability to complete the 2' walk test with no increase in symptoms in order to decrease pain      Subjective: Patient denies pain upon arrival today even with being out in the yard all morning. Any changes in Ambulatory Summary Sheet?   None    Objective:      COVID screening questions were asked and patient attested that there had been no contact or symptoms    Exercises: (No more than 4 columns) Exercise/Equipment Date  6/27/2022 Date  6/29/2022 7/6/2022 Date: 7/8/22 7/11/22             WARM UP        NuStep    5 min Lev 2 Seat 12, Arms 10 x 10 min Lev 3 Seat 12, Arms 10 x 10 min Lev 3 x6' S12/A12 Lvl 4 Pt stopped secondary to getting a cramp. S12/A12  Lv4 10'           TABLE        SKTC 3x30\" R LE 3x30\" R LE 3x30\" R LE 3x30\" R LE w/ strap 3x30\" B   Figure 4 piriformis stretch 2x30\" R LE 3x30\" R LE 3x30\" R LE 3x30\" R LE 3x30\" B   glute set 10x5\" 10x5\" 10x5\" 1x10 5\" 15x5\"   TA contraction 10x5\" 10x5\" 10x5\" 10 5\" 15x5\"   TA contraction UE 10x 15x  2# 2x10 2# 2x10 2# MB 2x10   TA contraction LE 10x B 15x ea side  2x10 ea side 2x10 B LE 2x10 BLE   bridges 10x 10x 2x10 2x10 3\" 2x10   Clamshell #1/2 R LE 10x ea  2x10 ea  RTB with #1 2x10  #2 2x10 2x10 with RTB  RTB 20x B   Resisted hip add R LE        DKTC With peanut ball  10x5\" With peanut ball   15x5\" With peanut ball  15x5\" 1x15 5\"  With peanut ball 1x15 5\"  With peanut ball                              STANDING                                                                     PROPRIOCEPTION                                                MODALITIES                          Other Therapeutic Activities/Education:     Home Exercise Program:    Access Code: [email or web address]  URL: [email or web address]  Date: 06/21/2022  Prepared by: Oscar Gregorio    Exercises  Supine Single Knee to Chest - 2 x daily - 7 x weekly - 3 sets - 5 sec hold  Supine Figure 4 Piriformis Stretch - 1 x daily - 7 x weekly - 2 sets - 30 sec hold  Supine Transversus Abdominis Bracing - Hands on Stomach - 2-3 x daily - 7 x weekly - 10 reps - 5 sec hold    Manual Treatments:  none    Modalities:  none    Communication with other providers:  eval faxed    Assessment:  (Response towards treatment session) (Pain Rating)  Patient rated his LB and R LE pain 2-3/10 after exercises.       Plan for Next Session: Continue per POC     Time In / Time Out:   1132-3247    Timed Code/Total Treatment Minutes:  47te     Next Progress Note due:  8/2/22    Plan of Care Interventions:  [x] Therapeutic Exercise  [] Modalities:  [x] Therapeutic Activity     [] Ultrasound  [] Estim  [x] Gait Training      [] Cervical Traction [] Lumbar Traction  [x] Neuromuscular Re-education    [] Cold/hotpack [] Iontophoresis   [x] Instruction in HEP      [] Vasopneumatic   [] Dry Needling    [] Manual Therapy               [] Aquatic Therapy              Electronically signed by:  Faina Jacinto PTA ,7/11/2022, 1:00 PM

## 2022-07-13 ENCOUNTER — HOSPITAL ENCOUNTER (OUTPATIENT)
Dept: PHYSICAL THERAPY | Age: 77
Setting detail: THERAPIES SERIES
Discharge: HOME | End: 2022-07-13
Payer: OTHER GOVERNMENT

## 2022-07-13 PROCEDURE — 97110 THERAPEUTIC EXERCISES: CPT

## 2022-07-13 PROCEDURE — 97112 NEUROMUSCULAR REEDUCATION: CPT

## 2022-07-13 NOTE — FLOWSHEET NOTE
Outpatient Physical Therapy  [place]           [] Phone: [phone]   Fax: [phone]  [place]           [x] Phone: [phone]   Fax: [phone]        Physical Therapy Daily Treatment Note  Date:  2022    Patient Name:  Tommy Swan    :  1945  MRN: [id number]  Restrictions/Precautions: fall risk    Diagnosis:   Spinal stenosis, site unspecified [M48.00]  Radiculopathy, lumbar region [M54.16]    Date of Injury/Surgery: chronic  Treatment Diagnosis:    M62.81 muscle weakness, R26.89 abnormality of gait  Insurance/Certification information:  [place]  Referring Physician:  Killian Ford MD     PCP: No primary care provider on file. Plan of care signed (Y/N):  eval faxed  Outcome Measure: 2 min walk test: 576.5 feet with pain around 1' and limping by 1:20 secondary to pain. Oswestry: 15/50 = 30% disability   Visit# / total visits:  7/  Pain level: 0/10 in R LE   Goals:     Patient goals:   pt reports he does not think PT will be able to help much    Long term goals to be achieved by 2022:   Long term goal 1: Pt will demonstrate I with current HEP as prescribed in order to increase strength. Long term goal 2: Pt will demonstrate R hip strength at least 4/5 in order to improve strength. Long term goal 3: Pt will demonstrate an Oswetry score of no more than 20% disability in order to improve quality of life. Long term goal 4: Pt will report worst pain no more than 4/10 in the last week in order to improve quality of life. Long term goal 5: Pt will demonstrate the ability to complete the 2' walk test with no increase in symptoms in order to decrease pain      Subjective: Patient denies pain upon arrival today even with being out and doing things prior to therapy. Any changes in Ambulatory Summary Sheet?   None    Objective:  Slight increase in pain at end of session    COVID screening questions were asked and patient attested that there had been no contact or symptoms    Exercises: (No more than 4 columns)   Exercise/Equipment Date  6/27/2022 7/11/22 7/13/22           WARM UP      NuStep    5 min Lev 2 S12/A12  Lv4 10' S12/A12  Lv4 10'         TABLE      SKTC 3x30\" R LE 3x30\" B 3x30\" B   Figure 4 piriformis stretch 2x30\" R LE 3x30\" B 3x30\" B   glute set 10x5\" 15x5\" 15x5\"   TA contraction 10x5\" 15x5\" 15x5\"   TA contraction UE 10x 2# MB 2x10 2# MB 2x10   TA contraction LE 10x B 2x10 BLE 2x10 B   bridges 10x 2x10 2x10   Clamshell #1/2 R LE 10x ea  RTB 20x B RTB 20x B   Resisted hip add R LE      DKTC With peanut ball  10x5\" 1x15 5\"  With peanut ball 1x15 5\"  With peanut ball                        STANDING                                                     PROPRIOCEPTION                                    MODALITIES                    Other Therapeutic Activities/Education:     Home Exercise Program:    Access Code: [email or web address]  URL: [email or web address]  Date: 06/21/2022  Prepared by: Socrates Araujo    Exercises  Supine Single Knee to Chest - 2 x daily - 7 x weekly - 3 sets - 5 sec hold  Supine Figure 4 Piriformis Stretch - 1 x daily - 7 x weekly - 2 sets - 30 sec hold  Supine Transversus Abdominis Bracing - Hands on Stomach - 2-3 x daily - 7 x weekly - 10 reps - 5 sec hold    Manual Treatments:  none    Modalities:  none    Communication with other providers:  eval faxed    Assessment:  (Response towards treatment session) (Pain Rating)  Patient rated his LB and R LE pain 1-2/10 after exercises.       Plan for Next Session: Continue per POC     Time In / Time Out:   7431-6886    Timed Code/Total Treatment Minutes:  45 15te 30nr     Next Progress Note due:  8/2/22    Plan of Care Interventions:  [x] Therapeutic Exercise  [] Modalities:  [x] Therapeutic Activity     [] Ultrasound  [] Estim  [x] Gait Training      [] Cervical Traction [] Lumbar Traction  [x] Neuromuscular Re-education    [] Cold/hotpack [] Iontophoresis   [x] Instruction in HEP      [] Vasopneumatic [] Dry Needling    [] Manual Therapy               [] Aquatic Therapy              Electronically signed by:  Alex French PTA ,7/13/2022, 12:57 PM

## 2022-07-18 ENCOUNTER — HOSPITAL ENCOUNTER (OUTPATIENT)
Dept: PHYSICAL THERAPY | Age: 77
Discharge: HOME | End: 2022-07-18

## 2022-07-18 NOTE — FLOWSHEET NOTE
Physical Therapy  Cancellation/No-show Note  Patient Name:  Ricky Bowling  :  1945   Date:  2022  Cancelled visits to date: 0  No-shows to date: 0    For today's appointment patient:  []  Cancelled  []  Rescheduled appointment  [x]  No-show     Reason given by patient:  []  Patient ill  []  Conflicting appointment  []  No transportation    []  Conflict with work  [x]  No reason given  []  Other:     Comments:      Electronically signed by:  Adry Stallings PT,DPT [id number] 2022, 3:45 PM

## 2022-08-12 ENCOUNTER — OFFICE VISIT (OUTPATIENT)
Dept: INTERNAL MEDICINE CLINIC | Age: 77
End: 2022-08-12
Payer: OTHER GOVERNMENT

## 2022-08-12 ENCOUNTER — TELEPHONE (OUTPATIENT)
Dept: INTERNAL MEDICINE CLINIC | Age: 77
End: 2022-08-12

## 2022-08-12 VITALS
TEMPERATURE: 97.2 F | OXYGEN SATURATION: 97 % | WEIGHT: 230 LBS | HEIGHT: 72 IN | HEART RATE: 66 BPM | BODY MASS INDEX: 31.15 KG/M2

## 2022-08-12 DIAGNOSIS — B96.89 BACTERIAL SINUSITIS: Primary | ICD-10-CM

## 2022-08-12 DIAGNOSIS — J32.9 BACTERIAL SINUSITIS: Primary | ICD-10-CM

## 2022-08-12 DIAGNOSIS — B96.89 BACTERIAL SINUSITIS: ICD-10-CM

## 2022-08-12 DIAGNOSIS — J32.9 BACTERIAL SINUSITIS: ICD-10-CM

## 2022-08-12 PROCEDURE — 99203 OFFICE O/P NEW LOW 30 MIN: CPT | Performed by: NURSE PRACTITIONER

## 2022-08-12 PROCEDURE — 1123F ACP DISCUSS/DSCN MKR DOCD: CPT | Performed by: NURSE PRACTITIONER

## 2022-08-12 RX ORDER — FLUTICASONE PROPIONATE 50 MCG
1 SPRAY, SUSPENSION (ML) NASAL DAILY
Qty: 16 G | Refills: 0 | Status: SHIPPED | OUTPATIENT
Start: 2022-08-12 | End: 2022-08-15 | Stop reason: SDUPTHER

## 2022-08-12 RX ORDER — FLUTICASONE PROPIONATE 50 MCG
1 SPRAY, SUSPENSION (ML) NASAL DAILY
Qty: 16 G | Refills: 0 | Status: SHIPPED | OUTPATIENT
Start: 2022-08-12 | End: 2022-08-12 | Stop reason: SDUPTHER

## 2022-08-12 RX ORDER — AMOXICILLIN AND CLAVULANATE POTASSIUM 875; 125 MG/1; MG/1
1 TABLET, FILM COATED ORAL 2 TIMES DAILY
Qty: 20 TABLET | Refills: 0 | Status: SHIPPED | OUTPATIENT
Start: 2022-08-12 | End: 2022-08-12 | Stop reason: SDUPTHER

## 2022-08-12 RX ORDER — AMOXICILLIN AND CLAVULANATE POTASSIUM 875; 125 MG/1; MG/1
1 TABLET, FILM COATED ORAL 2 TIMES DAILY
Qty: 20 TABLET | Refills: 0 | Status: SHIPPED | OUTPATIENT
Start: 2022-08-12 | End: 2022-08-15 | Stop reason: SDUPTHER

## 2022-08-12 NOTE — PROGRESS NOTES
8/12/2022    HPI:  Chief complaint and history of present illness as per medical assistant/nurse documented today in the Flu/COVID-19 clinic. MEDICATIONS:  Prior to Visit Medications    Medication Sig Taking? Authorizing Provider   albuterol sulfate  (90 Base) MCG/ACT inhaler Inhale 2 puffs into the lungs every 4 hours as needed for Wheezing Pharmacist may substitute  Minh Page DO   ondansetron (ZOFRAN ODT) 4 MG disintegrating tablet Take 1 tablet by mouth every 6 hours as needed for Nausea or Vomiting  Minh Page DO   Spacer/Aero-Holding Chambers GHAZAL 1 Device by Does not apply route daily as needed (with inhaler)  Brit Salvador MD   albuterol sulfate HFA (VENTOLIN HFA) 108 (90 Base) MCG/ACT inhaler Inhale 2 puffs into the lungs 4 times daily as needed for Wheezing  Brit Salvador MD   aspirin 81 MG tablet Take 81 mg by mouth daily  Historical Provider, MD   vitamin B-12 (CYANOCOBALAMIN) 500 MCG tablet Take 500 mcg by mouth daily  Historical Provider, MD   insulin glargine (LANTUS) 100 UNIT/ML injection vial Inject into the skin nightly  Historical Provider, MD   insulin lispro (HUMALOG) 100 UNIT/ML injection vial Inject into the skin 3 times daily (before meals)  Historical Provider, MD   losartan (COZAAR) 25 MG tablet Take 25 mg by mouth daily  Historical Provider, MD   metFORMIN (GLUCOPHAGE) 850 MG tablet Take 850 mg by mouth 2 times daily (with meals)  Historical Provider, MD   Multiple Vitamins-Minerals (THERAPEUTIC MULTIVITAMIN-MINERALS) tablet Take 1 tablet by mouth daily  Historical Provider, MD   simvastatin (ZOCOR) 40 MG tablet Take 40 mg by mouth nightly  Historical Provider, MD   tadalafil (CIALIS) 20 MG tablet Take 20 mg by mouth as needed for Erectile Dysfunction  Historical Provider, MD   terbinafine (LAMISIL) 1 % cream Apply topically 2 times daily Apply topically 2 times daily.   Historical Provider, MD           PHYSICAL EXAM:  Physical Exam    ASSESSMENT/PLAN:  There are no diagnoses linked to this encounter. FOLLOW-UP:  Return if symptoms worsen or fail to improve.     In addition to other information, the printed after visit summary provided to the patient includes:  [] COVID-19 Self care instructions  [] COVID-19 General patient information

## 2022-08-12 NOTE — PATIENT INSTRUCTIONS
Your COVID 19 test can take 1-5 days for the results to come back. We ask that you make a Mychart page and view your test results this way. You will need to Self quarantine until you know your results. If positive, please work on contact tracing. Increase fluids and rest  Warm salt gargles as needed for throat discomfort  Monitor temperature twice a day  Tylenol as needed for fevers and/or discomfort. Big deep breaths periodically throughout the day  If symptoms worsen -Go to the ER. Follow up with your primary care provider      To Whom it May Concern:    Stephen Rucker was tested for COVID-19 8/12/2022. He/she must stay home until test results are back. If test is positive, isolate for a total of 5 days, starting from day 1 of symptom onset. After 5 days, if fever-free for 24 hours and there has been a gradual improvement in symptoms, may come out of isolation, but must consistently wear a mask when around other people for 5 additional days. (5 days isolation, 5 days mask-wearing). We do not recommend retesting as patients may continue to test positive for months even though no longer contagious. It is suggested you call [address] or [address] [place] [address] [employer] with any questions regarding isolation timeframe/return to work/school details.

## 2022-08-12 NOTE — PROGRESS NOTES
8/12/2022    HPI:  Chief complaint and history of present illness as per medical assistant/nurse documented today in the Flu/COVID-19 clinic. Patient is here with complaints of cough, nasal congestion, and sore throat x 2 weeks. Patient states he has had his covid vaccine. MEDICATIONS:  Prior to Visit Medications    Medication Sig Taking? Authorizing Provider   amoxicillin-clavulanate (AUGMENTIN) 875-125 MG per tablet Take 1 tablet by mouth in the morning and 1 tablet before bedtime. Do all this for 10 days. Yes TASIA Guzman CNP   fluticasone (FLONASE) 50 MCG/ACT nasal spray 1 spray by Nasal route in the morning.  Yes TASIA Guzman CNP   albuterol sulfate  (90 Base) MCG/ACT inhaler Inhale 2 puffs into the lungs every 4 hours as needed for Wheezing Pharmacist may substitute  Jeremy Francisco,    ondansetron (ZOFRAN ODT) 4 MG disintegrating tablet Take 1 tablet by mouth every 6 hours as needed for Nausea or Vomiting  Jeremy Francisco DO   Spacer/Aero-Holding Chambers GHAZAL 1 Device by Does not apply route daily as needed (with inhaler)  Saritha Rios MD   albuterol sulfate HFA (VENTOLIN HFA) 108 (90 Base) MCG/ACT inhaler Inhale 2 puffs into the lungs 4 times daily as needed for Wheezing  Saritha Rios MD   aspirin 81 MG tablet Take 81 mg by mouth daily  Historical Provider, MD   vitamin B-12 (CYANOCOBALAMIN) 500 MCG tablet Take 500 mcg by mouth daily  Historical Provider, MD   insulin glargine (LANTUS) 100 UNIT/ML injection vial Inject into the skin nightly  Historical Provider, MD   insulin lispro (HUMALOG) 100 UNIT/ML injection vial Inject into the skin 3 times daily (before meals)  Historical Provider, MD   losartan (COZAAR) 25 MG tablet Take 25 mg by mouth daily  Historical Provider, MD   metFORMIN (GLUCOPHAGE) 850 MG tablet Take 850 mg by mouth 2 times daily (with meals)  Historical Provider, MD   Multiple Vitamins-Minerals (THERAPEUTIC MULTIVITAMIN-MINERALS) tablet Take 1 tablet by mouth daily  Historical Provider, MD   simvastatin (ZOCOR) 40 MG tablet Take 40 mg by mouth nightly  Historical Provider, MD   tadalafil (CIALIS) 20 MG tablet Take 20 mg by mouth as needed for Erectile Dysfunction  Historical Provider, MD   terbinafine (LAMISIL) 1 % cream Apply topically 2 times daily Apply topically 2 times daily. Historical Provider, MD       Allergies   Allergen Reactions    Lisinopril     Toradol [Ketorolac Tromethamine]    ,   Past Medical History:   Diagnosis Date    Cancer ([facility] [address]     Hypertension     Osteoarthritis    , History reviewed. No pertinent surgical history. ,   Social History     Tobacco Use    Smoking status: Never    Smokeless tobacco: Never   Substance Use Topics    Alcohol use: Not Currently    Drug use: Not Currently   , History reviewed. No pertinent family history. , There is no immunization history for the selected administration types on file for this patient.,   Health Maintenance   Topic Date Due    COVID-19 Vaccine (1) Never done    Lipids  Never done    Depression Screen  Never done    Hepatitis C screen  Never done    DTaP/Tdap/Td vaccine (1 - Tdap) Never done    Colorectal Cancer Screen  Never done    Shingles vaccine (1 of 2) Never done    Pneumococcal 65+ years Vaccine (1 - PCV) Never done    Flu vaccine (1) 09/01/2022    Hepatitis A vaccine  Aged Out    Hepatitis B vaccine  Aged Out    Hib vaccine  Aged Out    Meningococcal (ACWY) vaccine  Aged Out       PHYSICAL EXAM:  Physical Exam  Constitutional:       Appearance: Normal appearance. HENT:      Head: Normocephalic. Right Ear: Tympanic membrane, ear canal and external ear normal.      Left Ear: Tympanic membrane, ear canal and external ear normal.      Nose: Mucosal edema and congestion present. Right Sinus: No maxillary sinus tenderness or frontal sinus tenderness. Left Sinus: No maxillary sinus tenderness or frontal sinus tenderness. Mouth/Throat:      Lips: Pink.       Mouth: Mucous membranes are moist.      Pharynx: Posterior oropharyngeal erythema present. Cardiovascular:      Rate and Rhythm: Normal rate and regular rhythm. Heart sounds: Normal heart sounds. Pulmonary:      Effort: Pulmonary effort is normal.      Breath sounds: Normal breath sounds. Musculoskeletal:      Cervical back: Neck supple. Skin:     General: Skin is warm and dry. Neurological:      Mental Status: He is alert and oriented to person, place, and time. Psychiatric:         Mood and Affect: Mood normal.         Behavior: Behavior normal.       ASSESSMENT/PLAN:  1. Bacterial sinusitis  Advised to take medications as prescribed. Your COVID 19 test can take 1-5 days for the results to come back. We ask that you make a Mychart page and view your test results this way. You will need to Self quarantine until you know your results. If positive, please work on contact tracing. Increase fluids and rest  Warm salt gargles as needed for throat discomfort  Monitor temperature twice a day  Tylenol as needed for fevers and/or discomfort. Big deep breaths periodically throughout the day  If symptoms worsen -Go to the ER. Follow up with your primary care provider  - amoxicillin-clavulanate (AUGMENTIN) 875-125 MG per tablet; Take 1 tablet by mouth in the morning and 1 tablet before bedtime. Do all this for 10 days. Dispense: 20 tablet; Refill: 0  - fluticasone (FLONASE) 50 MCG/ACT nasal spray; 1 spray by Nasal route in the morning. Dispense: 16 g; Refill: 0  - COVID-19      FOLLOW-UP:  Return if symptoms worsen or fail to improve.     In addition to other information, the printed after visit summary provided to the patient includes:  [x] COVID-19 Self care instructions  [x] COVID-19 General patient information

## 2022-08-12 NOTE — PROGRESS NOTES
8/12/22  Otilia Alpers  1945    FLU/COVID-19 CLINIC EVALUATION    HPI SYMPTOMS:    Employer:    [] Fevers  [] Chills  [x] Cough  [] Coughing up blood  [] Chest Congestion  [x] Nasal Congestion  [] Feeling short of breath  [] Sometimes  [] Frequently  [] All the time  [] Chest pain  [] Headaches  []Tolerable  [] Severe  [x] Sore throat  [] Muscle aches  [] Nausea  [] Vomiting  []Unable to keep fluids down  [] Diarrhea  []Severe    [] OTHER SYMPTOMS:      Symptom Duration:   [] 1  [] 2   [] 3   [] 4    [] 5   [] 6   [] 7   [] 8   [] 9   [] 10   [] 11   [] 12   [] 13   [] 14   [x] Longer than 14 days    Symptom course:   [x] Worsening     [] Stable     [] Improving    RISK FACTORS:    [] Pregnant or possibly pregnant  [x] Age over 61  [x] Diabetes  [] Heart disease  [] Asthma  [] COPD/Other chronic lung diseases  [] Active Cancer  [] On Chemotherapy  [] Taking oral steroids  [] History Lymphoma/Leukemia  [] Close contact with a lab confirmed COVID-19 patient within 14 days of symptom onset  [] History of travel from affected geographical areas within 14 days of symptom onset       VITALS:  There were no vitals filed for this visit. TESTS:    POCT FLU:  [] Positive     []Negative    ASSESSMENT:    [] Flu  [] Possible COVID-19  [] Strep    PLAN:    [] Discharge home with written instructions for:  [] Flu management  [] Possible COVID-19 infection with self-quarantine and management of symptoms  [] Follow-up with primary care physician or emergency department if worsens  [] Evaluation per physician/NP/PA in clinic  [] Sent to ER       An  electronic signature was used to authenticate this note.      --Cheryle Cat MA on 8/12/2022 at 10:44 AM

## 2022-08-12 NOTE — TELEPHONE ENCOUNTER
Pts wife called and states that the insurance will not cover the sript for ATB at [facility] and is requesting it be sent to [address] [place] [address] in [facility]. Please Advise.

## 2022-08-14 LAB — SARS-COV-2, PCR: DETECTED

## 2022-08-15 DIAGNOSIS — B96.89 BACTERIAL SINUSITIS: ICD-10-CM

## 2022-08-15 DIAGNOSIS — J32.9 BACTERIAL SINUSITIS: ICD-10-CM

## 2022-08-15 RX ORDER — AMOXICILLIN AND CLAVULANATE POTASSIUM 875; 125 MG/1; MG/1
1 TABLET, FILM COATED ORAL 2 TIMES DAILY
Qty: 20 TABLET | Refills: 0 | Status: SHIPPED | OUTPATIENT
Start: 2022-08-15 | End: 2022-08-25

## 2022-08-15 RX ORDER — FLUTICASONE PROPIONATE 50 MCG
1 SPRAY, SUSPENSION (ML) NASAL DAILY
Qty: 16 G | Refills: 0 | Status: SHIPPED | OUTPATIENT
Start: 2022-08-15

## 2024-02-07 ENCOUNTER — HOSPITAL ENCOUNTER (EMERGENCY)
Age: 79
Discharge: HOME | End: 2024-02-07
Attending: EMERGENCY MEDICINE
Payer: OTHER GOVERNMENT

## 2024-02-07 VITALS
BODY MASS INDEX: 30.56 KG/M2 | RESPIRATION RATE: 24 BRPM | HEART RATE: 92 BPM | OXYGEN SATURATION: 91 % | SYSTOLIC BLOOD PRESSURE: 136 MMHG | DIASTOLIC BLOOD PRESSURE: 74 MMHG | WEIGHT: 225.6 LBS | TEMPERATURE: 97.7 F | HEIGHT: 72 IN

## 2024-02-07 DIAGNOSIS — T36.95XA ANTIBIOTIC-ASSOCIATED DIARRHEA: Primary | ICD-10-CM

## 2024-02-07 DIAGNOSIS — K52.1 ANTIBIOTIC-ASSOCIATED DIARRHEA: Primary | ICD-10-CM

## 2024-02-07 LAB
ANION GAP SERPL CALCULATED.3IONS-SCNC: 17 MMOL/L (ref 7–16)
BUN SERPL-MCNC: 25 MG/DL (ref 6–23)
CALCIUM SERPL-MCNC: 9.5 MG/DL (ref 8.3–10.6)
CHLORIDE BLD-SCNC: 97 MMOL/L (ref 99–110)
CO2: 24 MMOL/L (ref 21–32)
CREAT SERPL-MCNC: 1 MG/DL (ref 0.9–1.3)
GFR SERPL CREATININE-BSD FRML MDRD: >60 ML/MIN/1.73M2
GLUCOSE SERPL-MCNC: 164 MG/DL (ref 70–99)
POTASSIUM SERPL-SCNC: 4.5 MMOL/L (ref 3.5–5.1)
SODIUM BLD-SCNC: 138 MMOL/L (ref 135–145)

## 2024-02-07 PROCEDURE — 6360000002 HC RX W HCPCS: Performed by: EMERGENCY MEDICINE

## 2024-02-07 PROCEDURE — 96374 THER/PROPH/DIAG INJ IV PUSH: CPT

## 2024-02-07 PROCEDURE — 80048 BASIC METABOLIC PNL TOTAL CA: CPT

## 2024-02-07 PROCEDURE — 2580000003 HC RX 258: Performed by: EMERGENCY MEDICINE

## 2024-02-07 PROCEDURE — 6370000000 HC RX 637 (ALT 250 FOR IP): Performed by: EMERGENCY MEDICINE

## 2024-02-07 PROCEDURE — 99284 EMERGENCY DEPT VISIT MOD MDM: CPT

## 2024-02-07 PROCEDURE — 96372 THER/PROPH/DIAG INJ SC/IM: CPT

## 2024-02-07 RX ORDER — GABAPENTIN 400 MG/1
400 CAPSULE ORAL 2 TIMES DAILY
COMMUNITY
Start: 2023-04-20

## 2024-02-07 RX ORDER — SODIUM CHLORIDE, SODIUM LACTATE, POTASSIUM CHLORIDE, CALCIUM CHLORIDE 600; 310; 30; 20 MG/100ML; MG/100ML; MG/100ML; MG/100ML
1000 INJECTION, SOLUTION INTRAVENOUS ONCE
Status: COMPLETED | OUTPATIENT
Start: 2024-02-07 | End: 2024-02-07

## 2024-02-07 RX ORDER — TAMSULOSIN HYDROCHLORIDE 0.4 MG/1
0.4 CAPSULE ORAL DAILY
COMMUNITY
Start: 2023-10-30

## 2024-02-07 RX ORDER — ONDANSETRON 2 MG/ML
8 INJECTION INTRAMUSCULAR; INTRAVENOUS ONCE
Status: COMPLETED | OUTPATIENT
Start: 2024-02-07 | End: 2024-02-07

## 2024-02-07 RX ORDER — DICYCLOMINE HYDROCHLORIDE 10 MG/ML
20 INJECTION INTRAMUSCULAR ONCE
Status: COMPLETED | OUTPATIENT
Start: 2024-02-07 | End: 2024-02-07

## 2024-02-07 RX ORDER — ONDANSETRON 4 MG/1
4 TABLET, FILM COATED ORAL 3 TIMES DAILY PRN
Qty: 10 TABLET | Refills: 1 | Status: SHIPPED | OUTPATIENT
Start: 2024-02-07

## 2024-02-07 RX ADMIN — DICYCLOMINE HYDROCHLORIDE 20 MG: 10 INJECTION, SOLUTION INTRAMUSCULAR at 13:21

## 2024-02-07 RX ADMIN — SODIUM CHLORIDE, POTASSIUM CHLORIDE, SODIUM LACTATE AND CALCIUM CHLORIDE 1000 ML: 600; 310; 30; 20 INJECTION, SOLUTION INTRAVENOUS at 13:19

## 2024-02-07 RX ADMIN — BISMUTH SUBSALICYLATE 30 ML: 525 LIQUID ORAL at 14:21

## 2024-02-07 RX ADMIN — ONDANSETRON 8 MG: 2 INJECTION INTRAMUSCULAR; INTRAVENOUS at 13:19

## 2024-02-07 ASSESSMENT — PAIN DESCRIPTION - ONSET: ONSET: PROGRESSIVE

## 2024-02-07 ASSESSMENT — LIFESTYLE VARIABLES
HOW MANY STANDARD DRINKS CONTAINING ALCOHOL DO YOU HAVE ON A TYPICAL DAY: PATIENT DOES NOT DRINK
HOW OFTEN DO YOU HAVE A DRINK CONTAINING ALCOHOL: NEVER

## 2024-02-07 ASSESSMENT — PAIN SCALES - GENERAL: PAINLEVEL_OUTOF10: 8

## 2024-02-07 ASSESSMENT — PAIN DESCRIPTION - FREQUENCY: FREQUENCY: CONTINUOUS

## 2024-02-07 ASSESSMENT — PAIN DESCRIPTION - LOCATION: LOCATION: ABDOMEN

## 2024-02-07 ASSESSMENT — PAIN - FUNCTIONAL ASSESSMENT
PAIN_FUNCTIONAL_ASSESSMENT: 0-10
PAIN_FUNCTIONAL_ASSESSMENT: PREVENTS OR INTERFERES SOME ACTIVE ACTIVITIES AND ADLS

## 2024-02-07 ASSESSMENT — PAIN DESCRIPTION - DESCRIPTORS: DESCRIPTORS: DISCOMFORT

## 2024-02-07 ASSESSMENT — ENCOUNTER SYMPTOMS
ABDOMINAL PAIN: 1
NAUSEA: 1
DIARRHEA: 1

## 2024-02-07 ASSESSMENT — PAIN DESCRIPTION - PAIN TYPE: TYPE: ACUTE PAIN

## 2024-02-07 NOTE — ED PROVIDER NOTES
Triage Chief Complaint:   Abdominal Pain, Diarrhea, and Nausea (Having abd pain, nausea, belching and diarrhea after starting augmentin.)    [place]:  Mario Foster is a 78 y.o. male that presents to the ED with complaint of nausea dry heaves lower abdominal pain cramping and diarrhea.  He is recent with urgent care in January 22 started Augmentin.  Ever since he is abdominal pain cramping diarrhea no blood no mucus no history of inflammatory bowel disease such as Crohn's ulcerative colitis.  He has no underlying history of any GI malignancy.  He is just complaining abdominal pain and cramping.  No specific pain appetite has been fair he states last night he had 7 diarrheal episodes.  Lower abdominal cramping is noted no history of diverticulitis.  Patient is diabetic and is concerned about his blood sugar        Past Medical History:   Diagnosis Date    Cancer (HCC)     Hypertension     Osteoarthritis      History reviewed. No pertinent surgical history.  History reviewed. No pertinent family history.  Social History     Socioeconomic History    Marital status:      Spouse name: Not on file    Number of children: Not on file    Years of education: Not on file    Highest education level: Not on file   Occupational History    Not on file   Tobacco Use    Smoking status: Never    Smokeless tobacco: Never   Substance and Sexual Activity    Alcohol use: Not Currently    Drug use: Not Currently    Sexual activity: Not on file   Other Topics Concern    Not on file   Social History Narrative    Not on file     Social Determinants of Health     Financial Resource Strain: Not on file   Food Insecurity: Not on file   Transportation Needs: Not on file   Physical Activity: Not on file   Stress: Not on file   Social Connections: Not on file   Intimate Partner Violence: Not on file   Housing Stability: Not on file     No current facility-administered medications for this encounter.     Current Outpatient Medications

## 2024-02-07 NOTE — ED NOTES
Discussed the use of prescribed nausea medication. Encouraged patient to wait 20 to 30 minutes after taking medication before attempting to eat or drink. Recommended clear liquids only for the next 6 to 12 hours then slowly advance diet as tolerated. Patient voiced understanding. No additional questions asked.Discharge instructions reviewed with patient. Reviewed medications with patient. No additional questions asked.  Voiced understanding. Encouraged patient to follow up as discussed by the ED physician.